# Patient Record
Sex: FEMALE | Race: WHITE | URBAN - METROPOLITAN AREA
[De-identification: names, ages, dates, MRNs, and addresses within clinical notes are randomized per-mention and may not be internally consistent; named-entity substitution may affect disease eponyms.]

---

## 2023-11-06 ENCOUNTER — APPOINTMENT (RX ONLY)
Dept: URBAN - METROPOLITAN AREA CLINIC 46 | Facility: CLINIC | Age: 73
Setting detail: DERMATOLOGY
End: 2023-11-06

## 2023-11-06 DIAGNOSIS — L24 IRRITANT CONTACT DERMATITIS: ICD-10-CM | Status: RESOLVING

## 2023-11-06 DIAGNOSIS — Z00.00 ENCOUNTER FOR GENERAL ADULT MEDICAL EXAMINATION WITHOUT ABNORMAL FINDINGS: ICD-10-CM

## 2023-11-06 PROBLEM — L24.9 IRRITANT CONTACT DERMATITIS, UNSPECIFIED CAUSE: Status: ACTIVE | Noted: 2023-11-06

## 2023-11-06 PROCEDURE — ? COUNSELING

## 2023-11-06 PROCEDURE — 99202 OFFICE O/P NEW SF 15 MIN: CPT

## 2023-11-06 PROCEDURE — ? ADDITIONAL NOTES

## 2023-11-06 ASSESSMENT — SEVERITY ASSESSMENT 2020: SEVERITY 2020: MILD

## 2023-11-06 ASSESSMENT — LOCATION DETAILED DESCRIPTION DERM
LOCATION DETAILED: PHILTRUM
LOCATION DETAILED: RIGHT NASAL ALA
LOCATION DETAILED: LEFT NASAL ALA

## 2023-11-06 ASSESSMENT — ITCH NUMERIC RATING SCALE: HOW SEVERE IS YOUR ITCHING?: 0

## 2023-11-06 ASSESSMENT — LOCATION ZONE DERM
LOCATION ZONE: LIP
LOCATION ZONE: NOSE

## 2023-11-06 ASSESSMENT — LOCATION SIMPLE DESCRIPTION DERM
LOCATION SIMPLE: LEFT NOSE
LOCATION SIMPLE: UPPER LIP
LOCATION SIMPLE: RIGHT NOSE

## 2023-11-06 ASSESSMENT — BSA RASH: BSA RASH: 0

## 2023-11-06 NOTE — PROCEDURE: ADDITIONAL NOTES
Render Risk Assessment In Note?: no
Additional Notes: Today, I’m not able to dx with Vitiligo due to no depigmented areas of skin. There are some subtle pink patches on paranasal area which seems to be contributory from using TRET. This may, subtly, may the normal-appearing skin of the upper cutaneous lip look in contrast slightly noticeable.\\n\\nHPI: Patient states she has been dealing with this since February 2023. Patient reports she had a \"milk mustache” after LN2 treatment. Patient states she was prescribed OPZELURA and MOMETASONE previously By PA at  Dr. Jimenez's office. Patient states she has used both topicals for 7 months now with little response. No BX or light treatment was done to confirm DX.\\n\\nDiscussed there must have been repigmentation since there is no sign of depigmentation at this time. (Patient states she was using TRET which discussed will make a low grade redness or irritation in perinasal area.)\\n\\nD/C topicals.\\n\\nRTC 1 year fbse.
Detail Level: Simple
Additional Notes: Faint pink patches adoring paranasal area most likely due to using TRET.

## 2023-11-06 NOTE — HPI: DISCOLORATION (VITILIGO)
How Severe Is It?: mild
Is This A New Presentation, Or A Follow-Up?: Discoloration
Additional History: New patient here for possible vitiligo. Patient states she has been dealing with this since February. Reports pigmentation after LN2 treatment. Patient states she was prescribed OPZELURA and MOMETIZONE previously at Dr. Tay office. Patient states she has used both topicals for 7 months now with little response. Patient states there were no BX done to confirm DX.

## 2024-01-17 ENCOUNTER — APPOINTMENT (RX ONLY)
Dept: URBAN - METROPOLITAN AREA CLINIC 46 | Facility: CLINIC | Age: 74
Setting detail: DERMATOLOGY
End: 2024-01-17

## 2024-01-17 DIAGNOSIS — L57.8 OTHER SKIN CHANGES DUE TO CHRONIC EXPOSURE TO NONIONIZING RADIATION: ICD-10-CM | Status: STABLE

## 2024-01-17 PROCEDURE — ? ADDITIONAL NOTES

## 2024-01-17 PROCEDURE — ? COUNSELING

## 2024-01-17 PROCEDURE — 99212 OFFICE O/P EST SF 10 MIN: CPT

## 2024-01-17 ASSESSMENT — LOCATION DETAILED DESCRIPTION DERM
LOCATION DETAILED: RIGHT UPPER CUTANEOUS LIP
LOCATION DETAILED: LEFT UPPER CUTANEOUS LIP

## 2024-01-17 ASSESSMENT — LOCATION ZONE DERM
LOCATION ZONE: LIP
LOCATION ZONE: LIP

## 2024-01-17 ASSESSMENT — LOCATION SIMPLE DESCRIPTION DERM
LOCATION SIMPLE: RIGHT LIP
LOCATION SIMPLE: LEFT LIP

## 2024-01-17 NOTE — HPI: DISCOLORATION (VITILIGO)
How Severe Is It?: mild
Is This A New Presentation, Or A Follow-Up?: Discoloration
Additional History: Pt reports hx of vitiligo around mouth x 1 year. Pt states that she is interested in having Wood’s Lamp exam. Pt is currently taking Opzelura. Pt reports hx of thyroid disease and low iron levels.
HR Screen

## 2024-01-17 NOTE — PROCEDURE: ADDITIONAL NOTES
Detail Level: Zone
Additional Notes: Pt c/o discoloration on the upper lip x 1 year which she claims is vitiligo. She feels that people can tell that she has lightening on the upper lip from far away and this makes her self conscious. At times she has been ok with reassurance that there is no evidence of vitiligo, however, at other times she wants to do something about it.\\n\\n She has been using Opzelura now for 8 months and states that it has not been as effective recently. She is worried about long term side effects of Opzelura and she believes the condition may be spreading. She was seen by Dr Stallings a few months ago and was given reassurance. Pt is interested in having Wood’s Lamp exam to see if vitiligo areas are identified.\\n\\nWood’s Lamp exam performed, no illuminescence revealed. Result is negative, indicating no clinical evidence of vitiligo. Reassure pt. Advise that she does have some skin changes on the upper lip than can come with time and sun exposure - there skin there is thicker and this may be contributing to the color contrast that she is noticing. She uses tretinoin on her cheeks but not around her mouth. Advised that the skin on her cheeks are supple and smooth and the upper lip is rough and thickened. Recommend she start using tretinoin nightly to the upper lip area as well as this may help the skin on the upper lip match the skin on the cheeks.\\n\\nShe may also consider laser therapy or chemical peels to smooth skin texture. \\n\\nRTC PRN
Render Risk Assessment In Note?: no
Patient Management Risk Assessment: Low